# Patient Record
Sex: MALE | Race: WHITE | NOT HISPANIC OR LATINO | URBAN - METROPOLITAN AREA
[De-identification: names, ages, dates, MRNs, and addresses within clinical notes are randomized per-mention and may not be internally consistent; named-entity substitution may affect disease eponyms.]

---

## 2017-03-13 ENCOUNTER — EMERGENCY (EMERGENCY)
Facility: HOSPITAL | Age: 32
LOS: 1 days | Discharge: HOME | End: 2017-03-13
Attending: EMERGENCY MEDICINE
Payer: COMMERCIAL

## 2017-03-13 VITALS
WEIGHT: 225.09 LBS | DIASTOLIC BLOOD PRESSURE: 77 MMHG | SYSTOLIC BLOOD PRESSURE: 122 MMHG | OXYGEN SATURATION: 99 % | TEMPERATURE: 98 F | HEART RATE: 81 BPM | RESPIRATION RATE: 16 BRPM | HEIGHT: 72 IN

## 2017-03-13 DIAGNOSIS — L03.116 CELLULITIS OF LEFT LOWER LIMB: ICD-10-CM

## 2017-03-13 PROCEDURE — 99283 EMERGENCY DEPT VISIT LOW MDM: CPT

## 2017-03-13 RX ORDER — IBUPROFEN 200 MG
800 TABLET ORAL ONCE
Qty: 0 | Refills: 0 | Status: COMPLETED | OUTPATIENT
Start: 2017-03-13 | End: 2017-03-13

## 2017-03-13 RX ORDER — IBUPROFEN 200 MG
1 TABLET ORAL
Qty: 30 | Refills: 0 | OUTPATIENT
Start: 2017-03-13

## 2017-03-13 RX ADMIN — Medication 300 MILLIGRAM(S): at 22:55

## 2017-03-13 RX ADMIN — Medication 800 MILLIGRAM(S): at 22:55

## 2017-03-13 NOTE — ED STATDOCS - NS ED MD SCRIBE ATTENDING SCRIBE SECTIONS
DISPOSITION/PHYSICAL EXAM/REVIEW OF SYSTEMS/HISTORY OF PRESENT ILLNESS/HIV/VITAL SIGNS( Pullset)/PAST MEDICAL/SURGICAL/SOCIAL HISTORY

## 2017-03-13 NOTE — ED STATDOCS - OBJECTIVE STATEMENT
33 y/o male presents c/o left knee erythema x today. Pt hit the knee several days ago but noticed the redness this morning. Denies any joint pain, fever. Pt was seen at [facility] and told to come to ED No further complaints at this time. NKDA.

## 2017-09-14 ENCOUNTER — RX RENEWAL (OUTPATIENT)
Age: 32
End: 2017-09-14

## 2017-09-14 PROBLEM — Z00.00 ENCOUNTER FOR PREVENTIVE HEALTH EXAMINATION: Status: ACTIVE | Noted: 2017-09-14

## 2017-10-12 ENCOUNTER — RX RENEWAL (OUTPATIENT)
Age: 32
End: 2017-10-12

## 2017-11-08 ENCOUNTER — RX RENEWAL (OUTPATIENT)
Age: 32
End: 2017-11-08

## 2017-12-12 ENCOUNTER — RX RENEWAL (OUTPATIENT)
Age: 32
End: 2017-12-12

## 2018-01-09 ENCOUNTER — RX RENEWAL (OUTPATIENT)
Age: 33
End: 2018-01-09

## 2018-02-08 ENCOUNTER — RX RENEWAL (OUTPATIENT)
Age: 33
End: 2018-02-08

## 2018-03-09 ENCOUNTER — RX RENEWAL (OUTPATIENT)
Age: 33
End: 2018-03-09

## 2018-03-23 ENCOUNTER — APPOINTMENT (OUTPATIENT)
Dept: NEUROLOGY | Facility: CLINIC | Age: 33
End: 2018-03-23
Payer: COMMERCIAL

## 2018-03-23 VITALS
BODY MASS INDEX: 26.55 KG/M2 | HEIGHT: 72 IN | DIASTOLIC BLOOD PRESSURE: 73 MMHG | SYSTOLIC BLOOD PRESSURE: 120 MMHG | WEIGHT: 196 LBS

## 2018-03-23 PROCEDURE — 99214 OFFICE O/P EST MOD 30 MIN: CPT

## 2018-03-23 RX ORDER — AZITHROMYCIN 250 MG/1
250 TABLET, FILM COATED ORAL
Qty: 6 | Refills: 0 | Status: ACTIVE | COMMUNITY
Start: 2017-12-18

## 2018-03-23 RX ORDER — MULTIVITAMIN/IRON/FOLIC ACID 18MG-0.4MG
TABLET ORAL
Refills: 0 | Status: ACTIVE | COMMUNITY

## 2018-03-23 RX ORDER — MONTELUKAST 10 MG/1
10 TABLET, FILM COATED ORAL
Qty: 90 | Refills: 0 | Status: ACTIVE | COMMUNITY
Start: 2017-12-18

## 2018-03-23 RX ORDER — FLUTICASONE PROPIONATE AND SALMETEROL 50; 500 UG/1; UG/1
500-50 POWDER RESPIRATORY (INHALATION)
Refills: 0 | Status: ACTIVE | COMMUNITY

## 2018-03-23 RX ORDER — FLUTICASONE FUROATE AND VILANTEROL TRIFENATATE 100; 25 UG/1; UG/1
100-25 POWDER RESPIRATORY (INHALATION)
Qty: 60 | Refills: 0 | Status: ACTIVE | COMMUNITY
Start: 2017-12-18

## 2018-03-23 RX ORDER — ESOMEPRAZOLE MAGNESIUM 40 MG/1
40 CAPSULE, DELAYED RELEASE ORAL
Qty: 90 | Refills: 0 | Status: ACTIVE | COMMUNITY
Start: 2017-12-18

## 2018-04-09 ENCOUNTER — RX RENEWAL (OUTPATIENT)
Age: 33
End: 2018-04-09

## 2018-05-07 ENCOUNTER — RX RENEWAL (OUTPATIENT)
Age: 33
End: 2018-05-07

## 2018-05-17 ENCOUNTER — RX RENEWAL (OUTPATIENT)
Age: 33
End: 2018-05-17

## 2018-06-12 ENCOUNTER — APPOINTMENT (OUTPATIENT)
Dept: DERMATOLOGY | Facility: CLINIC | Age: 33
End: 2018-06-12

## 2018-07-05 ENCOUNTER — RX RENEWAL (OUTPATIENT)
Age: 33
End: 2018-07-05

## 2018-08-02 ENCOUNTER — RX RENEWAL (OUTPATIENT)
Age: 33
End: 2018-08-02

## 2018-09-06 ENCOUNTER — RX RENEWAL (OUTPATIENT)
Age: 33
End: 2018-09-06

## 2018-09-28 ENCOUNTER — APPOINTMENT (OUTPATIENT)
Dept: NEUROLOGY | Facility: CLINIC | Age: 33
End: 2018-09-28

## 2018-10-02 ENCOUNTER — RX RENEWAL (OUTPATIENT)
Age: 33
End: 2018-10-02

## 2018-10-12 ENCOUNTER — APPOINTMENT (OUTPATIENT)
Dept: NEUROLOGY | Facility: CLINIC | Age: 33
End: 2018-10-12
Payer: COMMERCIAL

## 2018-10-12 PROCEDURE — 99214 OFFICE O/P EST MOD 30 MIN: CPT

## 2018-10-31 ENCOUNTER — RX RENEWAL (OUTPATIENT)
Age: 33
End: 2018-10-31

## 2018-12-06 ENCOUNTER — RX RENEWAL (OUTPATIENT)
Age: 33
End: 2018-12-06

## 2019-01-03 ENCOUNTER — RX RENEWAL (OUTPATIENT)
Age: 34
End: 2019-01-03

## 2019-01-29 ENCOUNTER — MEDICATION RENEWAL (OUTPATIENT)
Age: 34
End: 2019-01-29

## 2019-03-04 ENCOUNTER — RX RENEWAL (OUTPATIENT)
Age: 34
End: 2019-03-04

## 2019-03-15 ENCOUNTER — TRANSCRIPTION ENCOUNTER (OUTPATIENT)
Age: 34
End: 2019-03-15

## 2019-03-15 ENCOUNTER — APPOINTMENT (OUTPATIENT)
Dept: ORTHOPEDIC SURGERY | Facility: CLINIC | Age: 34
End: 2019-03-15
Payer: COMMERCIAL

## 2019-03-15 VITALS
DIASTOLIC BLOOD PRESSURE: 70 MMHG | WEIGHT: 215 LBS | BODY MASS INDEX: 29.12 KG/M2 | HEIGHT: 72 IN | SYSTOLIC BLOOD PRESSURE: 127 MMHG | TEMPERATURE: 98.4 F | HEART RATE: 62 BPM

## 2019-03-15 DIAGNOSIS — Z78.9 OTHER SPECIFIED HEALTH STATUS: ICD-10-CM

## 2019-03-15 DIAGNOSIS — Z87.09 PERSONAL HISTORY OF OTHER DISEASES OF THE RESPIRATORY SYSTEM: ICD-10-CM

## 2019-03-15 PROCEDURE — 99203 OFFICE O/P NEW LOW 30 MIN: CPT

## 2019-03-15 PROCEDURE — 73140 X-RAY EXAM OF FINGER(S): CPT | Mod: TC,F5

## 2019-03-21 NOTE — ADDENDUM
[FreeTextEntry1] : This note was written by Migdalia More on 03/21/2019 acting as scribe for Clarissa Morgan, SREEDHAR/MELYSSA, PA\par

## 2019-03-21 NOTE — PHYSICAL EXAM
[de-identified] : Left Fingers/Hand: \par Range of Motion: \par                                    					           Claimant:  	   Normal:  \par \par Thumb Interphalangeal Hyperextension/Flexion		                           15H/80             15H/80\par \par Thumb Metacarpophalangeal Hyperextension/Flexion	                           10/55	    10/55\par \par Finger DIP Joints Extension/Flexion				             0/80	     0/80\par \par Finger PIP Joints Extension/Flexion 				             0/100	     0/100\par \par Finger MCP Joints Hyperextension/Flexion 			      (0-45H)/90	     (0-45H)/90\par \par FDS, FDP intact.  No triggering.  No tenderness or swelling over the A1 pulley for each finger.  No instability to varus or valgus stress.  No motor or sensory deficits.   Less than two second capillary refill.  Skin is intact.  No rashes, scars or lesions.\par \par Right Fingers/Hand: \par He is tender in the area of the base of the thumb.  Range of motion is not assessed secondary to pain.  No motor or sensory deficits.   Less than two second capillary refill.  Skin is intact.  No rashes, scars or lesions.\par  [de-identified] : Ambulating with a normal gait.  Station:  Normal. [de-identified] : General Appearance:  Well-developed, well-nourished male in no acute distress.\par  [de-identified] : X-ray examination, three views of the right thumb, reveals an avulsion fracture.

## 2019-03-21 NOTE — HISTORY OF PRESENT ILLNESS
[de-identified] : The patient comes in today with right thumb pain.  The patient states the onset/injury occurred on 19.  This injury is not work related or due to an automobile accident.  The patient states the pain is intermittent.  The patient describes the pain as sharp.  The patient states rest and not using the hand makes the better while turning objects (i.e. key, screwdriver, etc.) and touch makes the pain worse. \par \par Pain level includes a current pain level of 7/10.\par \par Ailment Interference:  \par Daily Livin/10\par Normal Work:  8/10\par Sleep:  1/10\par Enjoyment of Life:  3/10\par Climbing Stairs:  0/10\par Sports:  6/10\par Extra-Curricular Activities:  5/10 [] : No

## 2019-03-21 NOTE — DISCUSSION/SUMMARY
[de-identified] : The patient was placed into a brace for the right avulsion fracture.  He was advised to return to the office in two weeks.

## 2019-03-22 ENCOUNTER — APPOINTMENT (OUTPATIENT)
Dept: ORTHOPEDIC SURGERY | Facility: CLINIC | Age: 34
End: 2019-03-22
Payer: COMMERCIAL

## 2019-03-22 VITALS
DIASTOLIC BLOOD PRESSURE: 67 MMHG | HEART RATE: 52 BPM | SYSTOLIC BLOOD PRESSURE: 107 MMHG | WEIGHT: 215 LBS | BODY MASS INDEX: 29.12 KG/M2 | HEIGHT: 72 IN

## 2019-03-22 PROCEDURE — 99215 OFFICE O/P EST HI 40 MIN: CPT

## 2019-03-22 NOTE — PHYSICAL EXAM
[Normal] : Oriented to person, place, and time, insight and judgement were intact and the affect was normal [de-identified] : Right thumb exam\par \par Skin to the right thumb is intact with no erythema, ecchymosis, or edema. There are no gross deformities. There is full range of motion to the MP and IP joints of the thumb with good opposition to all fingers. The patient complains of some tenderness to palpation along the volar and radial aspects of the right thumb at the MP joint. The patient has good strength in all directions against resistance with thumb range of motion. Sensation is grossly intact without any deficits and capillary refill is brisk. [de-identified] : 3 x-ray views of the right thumb were reviewed from an outside institution. There is a small nondisplaced avulsion fracture of the first metacarpal neck on the radial aspect.

## 2019-03-22 NOTE — ASSESSMENT
[FreeTextEntry1] : Patient with a small nondisplaced avulsion fracture to the first metacarpal neck. The nature of this condition was described at length with the patient he verbalized understanding. I will place him in a thumb spica splint for the next 2 weeks and he will followup in one month for repeat x-rays and reevaluation. The patient is in agreement with this plan.

## 2019-03-29 ENCOUNTER — RX RENEWAL (OUTPATIENT)
Age: 34
End: 2019-03-29

## 2019-04-12 ENCOUNTER — APPOINTMENT (OUTPATIENT)
Dept: NEUROLOGY | Facility: CLINIC | Age: 34
End: 2019-04-12
Payer: COMMERCIAL

## 2019-04-12 VITALS
BODY MASS INDEX: 29.12 KG/M2 | WEIGHT: 215 LBS | SYSTOLIC BLOOD PRESSURE: 128 MMHG | HEIGHT: 72 IN | DIASTOLIC BLOOD PRESSURE: 70 MMHG

## 2019-04-12 PROCEDURE — 99214 OFFICE O/P EST MOD 30 MIN: CPT

## 2019-04-12 RX ORDER — DEXTROAMPHETAMINE SACCHARATE, AMPHETAMINE ASPARTATE, DEXTROAMPHETAMINE SULFATE AND AMPHETAMINE SULFATE 3.75; 3.75; 3.75; 3.75 MG/1; MG/1; MG/1; MG/1
15 TABLET ORAL TWICE DAILY
Qty: 60 | Refills: 0 | Status: DISCONTINUED | COMMUNITY
Start: 2017-09-14 | End: 2019-04-12

## 2019-04-19 ENCOUNTER — APPOINTMENT (OUTPATIENT)
Dept: ORTHOPEDIC SURGERY | Facility: CLINIC | Age: 34
End: 2019-04-19

## 2019-04-26 ENCOUNTER — APPOINTMENT (OUTPATIENT)
Dept: ORTHOPEDIC SURGERY | Facility: CLINIC | Age: 34
End: 2019-04-26
Payer: COMMERCIAL

## 2019-04-26 PROCEDURE — 99214 OFFICE O/P EST MOD 30 MIN: CPT

## 2019-04-26 PROCEDURE — 73140 X-RAY EXAM OF FINGER(S): CPT | Mod: TC,F5

## 2019-04-26 RX ORDER — MELOXICAM 15 MG/1
15 TABLET ORAL DAILY
Qty: 30 | Refills: 1 | Status: ACTIVE | COMMUNITY
Start: 2019-04-26 | End: 1900-01-01

## 2019-04-26 NOTE — ASSESSMENT
[FreeTextEntry1] : Patient with a healing avulsion fracture to the radial aspect of the right thumb. Initial injury was back in January he is still having pain there. Her legs and the patient for an MRI to further understand the etiology of his pain. Patient will make an appointment for her after the MRI to discuss the results and go over further treatment plan. The patient is in agreement with this plan. I will call the patient and a prescription for meloxicam for any discomfort.

## 2019-04-26 NOTE — PHYSICAL EXAM
[Normal] : Oriented to person, place, and time, insight and judgement were intact and the affect was normal [de-identified] : Right thumb exam\par \par Skin to the right thumb is intact with no erythema, ecchymosis, or edema. There are no gross deformities. There is full range of motion to the MP and IP joints of the thumb with good opposition to all fingers. The patient complains of some tenderness to palpation along the volar and radial aspects of the right thumb at the MP joint. The patient has good strength in all directions against resistance with thumb range of motion. Sensation is grossly intact without any deficits and capillary refill is brisk. [de-identified] : 3 x-ray views of the right thumb were obtained. There is good callus formation about the nondisplaced avulsion fracture of the first metacarpal neck.

## 2019-04-26 NOTE — HISTORY OF PRESENT ILLNESS
[FreeTextEntry1] : 03/22/2019: Patient is a 34-year-old male who presents to the office today with right thumb pain. He was playing hockey about the middle of January and got in a scuffle with another player and he states his thumb was extended backwards. Since then he's been having pain along the volar and radial aspects of the MP joint. He was seen about one week ago in an orthopedist office and x-rays showed an avulsion fracture of the first metacarpal head. The patient still has tenderness in that area but full function of the thumb. He denies any paresthesias.\par \par 04/26/2019: Patient returns to the office today with continued pain of the right thumb. He states the pain has improved but still lingers. Pain is exacerbated by when he is at work. Patient presents today for repeat x-rays and reevaluation. He denies any paresthesias.

## 2019-05-02 ENCOUNTER — RX RENEWAL (OUTPATIENT)
Age: 34
End: 2019-05-02

## 2019-05-03 ENCOUNTER — OUTPATIENT (OUTPATIENT)
Dept: OUTPATIENT SERVICES | Facility: HOSPITAL | Age: 34
LOS: 1 days | End: 2019-05-03
Payer: COMMERCIAL

## 2019-05-03 ENCOUNTER — APPOINTMENT (OUTPATIENT)
Dept: MRI IMAGING | Facility: CLINIC | Age: 34
End: 2019-05-03
Payer: COMMERCIAL

## 2019-05-03 DIAGNOSIS — Z00.00 ENCOUNTER FOR GENERAL ADULT MEDICAL EXAMINATION WITHOUT ABNORMAL FINDINGS: ICD-10-CM

## 2019-05-03 DIAGNOSIS — T14.8XXA OTHER INJURY OF UNSPECIFIED BODY REGION, INITIAL ENCOUNTER: ICD-10-CM

## 2019-05-03 PROCEDURE — 73218 MRI UPPER EXTREMITY W/O DYE: CPT

## 2019-05-04 ENCOUNTER — FORM ENCOUNTER (OUTPATIENT)
Age: 34
End: 2019-05-04

## 2019-05-05 PROCEDURE — 73218 MRI UPPER EXTREMITY W/O DYE: CPT | Mod: 26,RT

## 2019-05-08 ENCOUNTER — APPOINTMENT (OUTPATIENT)
Dept: ORTHOPEDIC SURGERY | Facility: CLINIC | Age: 34
End: 2019-05-08
Payer: COMMERCIAL

## 2019-05-08 PROCEDURE — 99214 OFFICE O/P EST MOD 30 MIN: CPT

## 2019-05-13 NOTE — HISTORY OF PRESENT ILLNESS
[FreeTextEntry1] : 03/22/2019: Patient is a 34-year-old male who presents to the office today with right thumb pain. He was playing hockey about the middle of January and got in a scuffle with another player and he states his thumb was extended backwards. Since then he's been having pain along the volar and radial aspects of the MP joint. He was seen about one week ago in an orthopedist office and x-rays showed an avulsion fracture of the first metacarpal head. The patient still has tenderness in that area but full function of the thumb. He denies any paresthesias.\par \par 04/26/2019: Patient returns to the office today with continued pain of the right thumb. He states the pain has improved but still lingers. Pain is exacerbated by when he is at work. Patient presents today for repeat x-rays and reevaluation. He denies any paresthesias. \par \par 05/09/2019: Patient returns to the office for repeat evaluation of his right thumb. He states that he is seeing some improvement in his symptoms. He comes in today to discuss the results of his MRI of the right hand. He denies any paresthesias.

## 2019-05-13 NOTE — ASSESSMENT
[FreeTextEntry1] : Patient healing well clinically and radiographically from a fracture at the base of the metacarpal head of the right thumb and a RCL sprain. The nature of this condition was described at length with the patient he verbalized understanding. He may return to activities as tolerated. I recommend a course of hand therapy. The patient will followup p.r.n.

## 2019-05-13 NOTE — PHYSICAL EXAM
[Normal] : Alert and in no acute distress [de-identified] : MRI results were discussed with the patient. Patient with a partial sprain of the RCL at the MCP of the right thumb. There is also a healing fracture at the volar base of the metacarpal head. [de-identified] : Right thumb exam\par \par Skin to the right thumb is intact with no erythema, ecchymosis, or edema. There are no gross deformities. There is full range of motion to the MP and IP joints of the thumb with good opposition to all fingers. The tenderness to palpation along the volar and radial aspects of the right thumb at the MP joint has improved. The patient has good strength in all directions against resistance with thumb range of motion. Sensation is grossly intact without any deficits and capillary refill is brisk.

## 2019-05-31 ENCOUNTER — RX RENEWAL (OUTPATIENT)
Age: 34
End: 2019-05-31

## 2019-06-06 ENCOUNTER — TRANSCRIPTION ENCOUNTER (OUTPATIENT)
Age: 34
End: 2019-06-06

## 2019-06-27 ENCOUNTER — RX RENEWAL (OUTPATIENT)
Age: 34
End: 2019-06-27

## 2019-08-13 ENCOUNTER — MEDICATION RENEWAL (OUTPATIENT)
Age: 34
End: 2019-08-13

## 2019-09-10 ENCOUNTER — RX RENEWAL (OUTPATIENT)
Age: 34
End: 2019-09-10

## 2019-10-07 ENCOUNTER — RX RENEWAL (OUTPATIENT)
Age: 34
End: 2019-10-07

## 2019-10-18 ENCOUNTER — APPOINTMENT (OUTPATIENT)
Dept: NEUROLOGY | Facility: CLINIC | Age: 34
End: 2019-10-18
Payer: COMMERCIAL

## 2019-10-18 VITALS
SYSTOLIC BLOOD PRESSURE: 140 MMHG | HEIGHT: 72 IN | WEIGHT: 215 LBS | DIASTOLIC BLOOD PRESSURE: 80 MMHG | BODY MASS INDEX: 29.12 KG/M2

## 2019-10-18 PROCEDURE — 99214 OFFICE O/P EST MOD 30 MIN: CPT

## 2019-11-08 ENCOUNTER — RX RENEWAL (OUTPATIENT)
Age: 34
End: 2019-11-08

## 2019-11-29 ENCOUNTER — APPOINTMENT (OUTPATIENT)
Dept: ORTHOPEDIC SURGERY | Facility: CLINIC | Age: 34
End: 2019-11-29
Payer: COMMERCIAL

## 2019-11-29 DIAGNOSIS — S53.439A RADIAL COLLATERAL LIGAMENT SPRAIN OF UNSPECIFIED ELBOW, INITIAL ENCOUNTER: ICD-10-CM

## 2019-11-29 DIAGNOSIS — T14.8XXA OTHER INJURY OF UNSPECIFIED BODY REGION, INITIAL ENCOUNTER: ICD-10-CM

## 2019-11-29 PROCEDURE — 99213 OFFICE O/P EST LOW 20 MIN: CPT

## 2019-11-29 PROCEDURE — 73140 X-RAY EXAM OF FINGER(S): CPT | Mod: F5,TC

## 2019-11-29 RX ORDER — MELOXICAM 15 MG/1
15 TABLET ORAL
Qty: 30 | Refills: 2 | Status: ACTIVE | COMMUNITY
Start: 2019-11-29 | End: 1900-01-01

## 2019-12-02 PROBLEM — S53.439A SPRAIN OF RADIAL COLLATERAL LIGAMENT: Status: ACTIVE | Noted: 2019-05-08

## 2019-12-02 PROBLEM — T14.8XXA AVULSION FRACTURE: Status: ACTIVE | Noted: 2019-03-15

## 2019-12-02 NOTE — HISTORY OF PRESENT ILLNESS
[FreeTextEntry1] : 03/22/2019: Patient is a 34-year-old male who presents to the office today with right thumb pain. He was playing hockey about the middle of January and got in a scuffle with another player and he states his thumb was extended backwards. Since then he's been having pain along the volar and radial aspects of the MP joint. He was seen about one week ago in an orthopedist office and x-rays showed an avulsion fracture of the first metacarpal head. The patient still has tenderness in that area but full function of the thumb. He denies any paresthesias.\par \par 04/26/2019: Patient returns to the office today with continued pain of the right thumb. He states the pain has improved but still lingers. Pain is exacerbated by when he is at work. Patient presents today for repeat x-rays and reevaluation. He denies any paresthesias. \par \par 05/09/2019: Patient returns to the office for repeat evaluation of his right thumb. He states that he is seeing some improvement in his symptoms. He comes in today to discuss the results of his MRI of the right hand. He denies any paresthesias.\par \par 11/29/2019: Patient returns to the office for repeat evaluation of his right thumb. He notes that he is still having pain at the base of the thumb. His pain is activity related and exacerbates him while he plays hockey and when he is at work. He presents for further evaluation. He denies paresthesias.

## 2019-12-02 NOTE — ASSESSMENT
[FreeTextEntry1] : Patient with continued pain at the base of the thumb. He denies any new injury. Likely exacerbating his symptoms with activity at work and while playing hockey. He should rest his thumb from activities. I recommend a course of hand therapy and meloxicam for an anti-inflammatory. He will follow up in 6 weeks if no improvement.

## 2019-12-02 NOTE — PHYSICAL EXAM
[Normal Finger/nose] : finger to nose coordination [Normal] : no peripheral adenopathy appreciated [de-identified] : Right thumb exam\par \par Skin to the right thumb is intact with no erythema, ecchymosis, or edema. There are no gross deformities. There is full range of motion to the MP and IP joints of the thumb with good opposition to all fingers. The tenderness to palpation along the volar and radial aspects of the right thumb at the MP joint has improved. The patient has good strength in all directions against resistance with thumb range of motion with good stability. Sensation is grossly intact without any deficits and capillary refill is brisk. [de-identified] : ALHAJIR [de-identified] : MRI results were discussed with the patient. Patient with a partial sprain of the RCL at the MCP of the right thumb. There is also a healed fracture at the volar base of the metacarpal head.

## 2019-12-05 ENCOUNTER — RX RENEWAL (OUTPATIENT)
Age: 34
End: 2019-12-05

## 2019-12-31 ENCOUNTER — RX RENEWAL (OUTPATIENT)
Age: 34
End: 2019-12-31

## 2020-04-20 ENCOUNTER — APPOINTMENT (OUTPATIENT)
Dept: NEUROLOGY | Facility: CLINIC | Age: 35
End: 2020-04-20
Payer: COMMERCIAL

## 2020-04-20 PROCEDURE — 99213 OFFICE O/P EST LOW 20 MIN: CPT | Mod: 95

## 2020-04-20 NOTE — HISTORY OF PRESENT ILLNESS
[FreeTextEntry1] : I have been following for many years for concentration difficulties. These are present throughout the day.They are moderately severe. He has been diagnosed with attention deficit disorder. He is currently on Adderall 15 mg twice a day doing well. He is having no problem with the medication. He works  for [employer].

## 2020-04-20 NOTE — REASON FOR VISIT
[Home] : at home, [unfilled] , at the time of the visit. [Other Location: e.g. Home (Enter Location, City,State)___] : at [unfilled] [Self] : self [Patient] : the patient [Follow-Up: _____] : a [unfilled] follow-up visit [FreeTextEntry1] : Chief complaint: Attention deficit disorder.

## 2020-04-20 NOTE — ASSESSMENT
[FreeTextEntry1] : Attention deficit disorder. Long history of concentration difficulties. Currently doing well on Adderall 15 mg twice a day which will be continued. Followup planned in 6 months.

## 2020-10-16 ENCOUNTER — APPOINTMENT (OUTPATIENT)
Dept: NEUROLOGY | Facility: CLINIC | Age: 35
End: 2020-10-16
Payer: COMMERCIAL

## 2020-10-16 PROCEDURE — 99214 OFFICE O/P EST MOD 30 MIN: CPT

## 2021-06-23 ENCOUNTER — APPOINTMENT (OUTPATIENT)
Dept: NEUROLOGY | Facility: CLINIC | Age: 36
End: 2021-06-23
Payer: COMMERCIAL

## 2021-06-23 VITALS
SYSTOLIC BLOOD PRESSURE: 146 MMHG | WEIGHT: 220 LBS | HEIGHT: 72 IN | TEMPERATURE: 97.3 F | BODY MASS INDEX: 29.8 KG/M2 | DIASTOLIC BLOOD PRESSURE: 80 MMHG

## 2021-06-23 PROCEDURE — 99072 ADDL SUPL MATRL&STAF TM PHE: CPT

## 2021-06-23 PROCEDURE — 99214 OFFICE O/P EST MOD 30 MIN: CPT

## 2021-12-29 ENCOUNTER — APPOINTMENT (OUTPATIENT)
Dept: NEUROLOGY | Facility: CLINIC | Age: 36
End: 2021-12-29
Payer: COMMERCIAL

## 2021-12-29 PROCEDURE — 99213 OFFICE O/P EST LOW 20 MIN: CPT | Mod: 95

## 2022-06-30 ENCOUNTER — NON-APPOINTMENT (OUTPATIENT)
Age: 37
End: 2022-06-30

## 2022-06-30 ENCOUNTER — APPOINTMENT (OUTPATIENT)
Dept: NEUROLOGY | Facility: CLINIC | Age: 37
End: 2022-06-30

## 2022-06-30 VITALS
WEIGHT: 215 LBS | HEIGHT: 72 IN | BODY MASS INDEX: 29.12 KG/M2 | DIASTOLIC BLOOD PRESSURE: 80 MMHG | SYSTOLIC BLOOD PRESSURE: 128 MMHG

## 2022-06-30 PROCEDURE — 99214 OFFICE O/P EST MOD 30 MIN: CPT

## 2022-06-30 NOTE — PHYSICAL EXAM
[FreeTextEntry1] : \par \par Mental status: Alert, fully oriented, speech comprehension intact, recent memory intact\par \par Cranial nerves: No ptosis  Extraocular movements full. Facial strength and sensation are normal. Tongue midline.\par \par Motor: Strength grossly normal throughout. There is no drift.  No abnormal movements observed.\par \par Sensation: Intact to touch throughout\par \par Coordination: Finger-nose intact\par \par Symmetrical, normal active\par \par Gait normal, Romberg absent\par \par

## 2022-06-30 NOTE — REASON FOR VISIT
[Follow-Up: _____] : a [unfilled] follow-up visit [FreeTextEntry1] : Chief complaint: Attention deficit disorder.

## 2023-01-04 ENCOUNTER — APPOINTMENT (OUTPATIENT)
Dept: NEUROLOGY | Facility: CLINIC | Age: 38
End: 2023-01-04
Payer: COMMERCIAL

## 2023-01-04 VITALS
SYSTOLIC BLOOD PRESSURE: 120 MMHG | DIASTOLIC BLOOD PRESSURE: 80 MMHG | HEIGHT: 72 IN | BODY MASS INDEX: 29.12 KG/M2 | WEIGHT: 215 LBS

## 2023-01-04 PROCEDURE — 99214 OFFICE O/P EST MOD 30 MIN: CPT

## 2023-05-05 ENCOUNTER — APPOINTMENT (OUTPATIENT)
Dept: RHEUMATOLOGY | Facility: CLINIC | Age: 38
End: 2023-05-05

## 2023-07-10 ENCOUNTER — APPOINTMENT (OUTPATIENT)
Dept: NEUROLOGY | Facility: CLINIC | Age: 38
End: 2023-07-10
Payer: COMMERCIAL

## 2023-07-10 VITALS
SYSTOLIC BLOOD PRESSURE: 120 MMHG | DIASTOLIC BLOOD PRESSURE: 80 MMHG | HEIGHT: 72 IN | BODY MASS INDEX: 30.48 KG/M2 | WEIGHT: 225 LBS

## 2023-07-10 PROCEDURE — 99214 OFFICE O/P EST MOD 30 MIN: CPT

## 2024-01-10 ENCOUNTER — APPOINTMENT (OUTPATIENT)
Dept: NEUROLOGY | Facility: CLINIC | Age: 39
End: 2024-01-10
Payer: COMMERCIAL

## 2024-01-10 VITALS
SYSTOLIC BLOOD PRESSURE: 122 MMHG | HEIGHT: 72 IN | DIASTOLIC BLOOD PRESSURE: 80 MMHG | WEIGHT: 215 LBS | BODY MASS INDEX: 29.12 KG/M2

## 2024-01-10 DIAGNOSIS — F98.8 OTHER SPECIFIED BEHAVIORAL AND EMOTIONAL DISORDERS WITH ONSET USUALLY OCCURRING IN CHILDHOOD AND ADOLESCENCE: ICD-10-CM

## 2024-01-10 PROCEDURE — 99214 OFFICE O/P EST MOD 30 MIN: CPT

## 2024-05-24 RX ORDER — DEXTROAMPHETAMINE SACCHARATE, AMPHETAMINE ASPARTATE, DEXTROAMPHETAMINE SULFATE AND AMPHETAMINE SULFATE 3.75; 3.75; 3.75; 3.75 MG/1; MG/1; MG/1; MG/1
15 TABLET ORAL TWICE DAILY
Qty: 60 | Refills: 0 | Status: ACTIVE | COMMUNITY
Start: 2017-10-12 | End: 1900-01-01

## 2024-07-10 ENCOUNTER — APPOINTMENT (OUTPATIENT)
Dept: NEUROLOGY | Facility: CLINIC | Age: 39
End: 2024-07-10

## 2025-08-15 ENCOUNTER — NON-APPOINTMENT (OUTPATIENT)
Age: 40
End: 2025-08-15

## 2025-08-15 ENCOUNTER — APPOINTMENT (OUTPATIENT)
Dept: NEUROLOGY | Facility: CLINIC | Age: 40
End: 2025-08-15
Payer: COMMERCIAL

## 2025-08-15 VITALS
BODY MASS INDEX: 31.83 KG/M2 | SYSTOLIC BLOOD PRESSURE: 124 MMHG | WEIGHT: 235 LBS | DIASTOLIC BLOOD PRESSURE: 72 MMHG | HEIGHT: 72 IN

## 2025-08-15 DIAGNOSIS — F98.8 OTHER SPECIFIED BEHAVIORAL AND EMOTIONAL DISORDERS WITH ONSET USUALLY OCCURRING IN CHILDHOOD AND ADOLESCENCE: ICD-10-CM

## 2025-08-15 PROCEDURE — 99214 OFFICE O/P EST MOD 30 MIN: CPT

## 2025-08-15 PROCEDURE — G2211 COMPLEX E/M VISIT ADD ON: CPT | Mod: NC

## 2025-08-28 ENCOUNTER — NON-APPOINTMENT (OUTPATIENT)
Age: 40
End: 2025-08-28